# Patient Record
Sex: MALE | Race: WHITE | Employment: UNEMPLOYED | URBAN - METROPOLITAN AREA
[De-identification: names, ages, dates, MRNs, and addresses within clinical notes are randomized per-mention and may not be internally consistent; named-entity substitution may affect disease eponyms.]

---

## 2024-10-03 ENCOUNTER — OFFICE VISIT (OUTPATIENT)
Age: 69
End: 2024-10-03
Payer: MEDICARE

## 2024-10-03 VITALS
DIASTOLIC BLOOD PRESSURE: 64 MMHG | WEIGHT: 195.4 LBS | BODY MASS INDEX: 27.35 KG/M2 | OXYGEN SATURATION: 99 % | SYSTOLIC BLOOD PRESSURE: 120 MMHG | HEIGHT: 71 IN | HEART RATE: 57 BPM

## 2024-10-03 DIAGNOSIS — R06.02 SHORTNESS OF BREATH: ICD-10-CM

## 2024-10-03 DIAGNOSIS — R07.9 CHEST PAIN, UNSPECIFIED TYPE: Primary | ICD-10-CM

## 2024-10-03 DIAGNOSIS — E78.2 MIXED HYPERLIPIDEMIA: ICD-10-CM

## 2024-10-03 DIAGNOSIS — R00.1 BRADYCARDIA: ICD-10-CM

## 2024-10-03 PROCEDURE — 93005 ELECTROCARDIOGRAM TRACING: CPT | Performed by: INTERNAL MEDICINE

## 2024-10-03 PROCEDURE — 1036F TOBACCO NON-USER: CPT | Performed by: INTERNAL MEDICINE

## 2024-10-03 PROCEDURE — G8427 DOCREV CUR MEDS BY ELIG CLIN: HCPCS | Performed by: INTERNAL MEDICINE

## 2024-10-03 PROCEDURE — G8419 CALC BMI OUT NRM PARAM NOF/U: HCPCS | Performed by: INTERNAL MEDICINE

## 2024-10-03 PROCEDURE — 3017F COLORECTAL CA SCREEN DOC REV: CPT | Performed by: INTERNAL MEDICINE

## 2024-10-03 PROCEDURE — 1123F ACP DISCUSS/DSCN MKR DOCD: CPT | Performed by: INTERNAL MEDICINE

## 2024-10-03 PROCEDURE — G8484 FLU IMMUNIZE NO ADMIN: HCPCS | Performed by: INTERNAL MEDICINE

## 2024-10-03 PROCEDURE — 93010 ELECTROCARDIOGRAM REPORT: CPT | Performed by: INTERNAL MEDICINE

## 2024-10-03 PROCEDURE — 99204 OFFICE O/P NEW MOD 45 MIN: CPT | Performed by: INTERNAL MEDICINE

## 2024-10-03 RX ORDER — PROPRANOLOL HCL 60 MG
1 CAPSULE, EXTENDED RELEASE 24HR ORAL
COMMUNITY
Start: 2024-01-08

## 2024-10-03 RX ORDER — PRIMIDONE 250 MG/1
250 TABLET ORAL
COMMUNITY
Start: 2024-04-11

## 2024-10-03 RX ORDER — ASPIRIN 81 MG/1
81 TABLET ORAL
COMMUNITY
Start: 2024-09-25

## 2024-10-03 RX ORDER — ATORVASTATIN CALCIUM 40 MG/1
40 TABLET, FILM COATED ORAL
COMMUNITY
Start: 2024-09-25

## 2024-10-03 RX ORDER — NITROGLYCERIN 0.4 MG/1
0.4 TABLET SUBLINGUAL
COMMUNITY
Start: 2024-09-25

## 2024-10-03 RX ORDER — LORATADINE 10 MG/1
10 TABLET ORAL DAILY
COMMUNITY

## 2024-10-03 RX ORDER — ISOSORBIDE MONONITRATE 30 MG/1
30 TABLET, EXTENDED RELEASE ORAL DAILY
Qty: 30 TABLET | Refills: 3 | Status: SHIPPED | OUTPATIENT
Start: 2024-10-03

## 2024-10-03 RX ORDER — HYALURONATE SODIUM, STABILIZED 88 MG/4 ML
SYRINGE (ML) INTRAARTICULAR
COMMUNITY
Start: 2024-09-16

## 2024-10-03 RX ORDER — ACETAMINOPHEN 160 MG
1 TABLET,DISINTEGRATING ORAL
COMMUNITY

## 2024-10-03 ASSESSMENT — PATIENT HEALTH QUESTIONNAIRE - PHQ9
SUM OF ALL RESPONSES TO PHQ QUESTIONS 1-9: 0
1. LITTLE INTEREST OR PLEASURE IN DOING THINGS: NOT AT ALL
SUM OF ALL RESPONSES TO PHQ QUESTIONS 1-9: 0
SUM OF ALL RESPONSES TO PHQ9 QUESTIONS 1 & 2: 0
2. FEELING DOWN, DEPRESSED OR HOPELESS: NOT AT ALL

## 2024-10-03 NOTE — PROGRESS NOTES
Cardiology  Clinic -   New Patient  Visit   Date of Service : 10/3/2024    Name: Marlo Chung  Patient ID: [id number]  Age: 69 y.o. Sex: male YOB: 1955    Author: COREY BANERJEE MD    PCP: Jason Villar MD    Referring Provider:Jason Villar MD    Reason for Visit / CC:    Chief Complaint   Patient presents with    New Patient         ASSESSMENT  AND PLAN           Assessment & Plan        1. Recurrent episodes of chest pain with abnormal nuclear stress test suggestive of inferior wall ischemia.  He continues to complain of intermittent episodes of chest pain radiating to the left. He was recently hospitalized in [place] with worsening chest pain and was advised to undergo left heart catheterization but preferred to get it done in his hometown. He continues to have chest pain despite being on propranolol and Imdur. A left heart catheterization will be scheduled to assess coronary anatomy. Continue aspirin 81 mg p.o. q. daily along with Lipitor 40 mg once daily.  Risk-benefit alternative of left heart catheterization discussed with the patient patient agrees to proceed  Will also get an echo to assess left ventricular systolic and diastolic function    2. Hyperlipidemia.  Fairly well controlled on Lipitor 40 mg once daily.    3. Left ankle and foot swelling most likely due to recent travel, now recovered.  If it returns, ruling out DVT may be considered.    4. Hand tremors.  Continue propranolol and primidone for tremors.    3. Former smoker.  He quit smoking over 20 years ago and was congratulated on this achievement.     4.  Severe sinus bradycardia asymptomatic --plan to-Holter monitor to rule out any significant cardiac arrhythmias    Follow-up  Return in 3 months for follow up.          Patient advised aggressive risk factor and lifestyle modification- diet and exercise counseling done   patient advised strict compliance with medication and follow-up   handouts were given to

## 2024-10-04 ENCOUNTER — TELEPHONE (OUTPATIENT)
Age: 69
End: 2024-10-04

## 2024-10-04 DIAGNOSIS — R94.39 ABNORMAL STRESS TEST: Primary | ICD-10-CM

## 2024-10-04 NOTE — TELEPHONE ENCOUNTER
Spoke to patient and scheduled him for C with Dr. Larsen on 10/15 @ 10:45 am with 8:45 am arrival. Instructions gone over with patient and advised labs already done, no medications to hold. Patient verbalized understanding and had no questions at the time of call.    Patient identification verified x2.         Patient Instructions    Catheterization     Pre-procedure instructions  Lab work: Already done.  [facility] Draw Sites:  [facility] [place]: [address] [place] [address] [address] [facility] [address]  [place]: [address] [facility] [address]  [place]: [address] [place] [place] [address] [facility] [address]  [facility] [place]: [address] [facility] [address]  [place]: [address] [facility] [address]  [facility]: [address]  [address] [facility] [address]  [place]: [address] [facility] [address]  Critical access hospital: [address]. [place] [address]  The night before the procedure nothing to eat or drink after midnight, you may take approved medications the morning of the procedure with a few sips of water.  Stop blood thinners 2 days prior to procedure EXCEPT: Brilinta, Plavix or Aspirin  Medications restrictions: No medications to hold.    Procedure day  Have a  that will bring you and take you home (6-8 hours)  Have a responsible adult that can stay with you for 24 hours  Bring ID and insurance card  Wear comfortable clothing  Leave valuables at home,   Bring: dentures, hearing aids, or glasses  Bring overnight bag (just in case of an overnight stay)  Where to report  [facility]: go through main entrance and to the left is outpatient registration    Date of procedure: 10/15 w/ Dr. Larsen  Arrival Time: 8:45 am    Post procedure instructions  No driving for 24 hours  No heavy lifting (over 10lbs) or strenuous activity for 48 hours  No baths, swimming, hot tubs, or spas for a week  The band aid over the

## 2024-10-11 ENCOUNTER — PREP FOR PROCEDURE (OUTPATIENT)
Age: 69
End: 2024-10-11

## 2024-10-15 ENCOUNTER — HOSPITAL ENCOUNTER (OUTPATIENT)
Facility: HOSPITAL | Age: 69
Discharge: HOME | End: 2024-10-15
Attending: INTERNAL MEDICINE | Admitting: INTERNAL MEDICINE
Payer: MEDICARE

## 2024-10-15 VITALS
SYSTOLIC BLOOD PRESSURE: 119 MMHG | HEIGHT: 71 IN | TEMPERATURE: 98 F | DIASTOLIC BLOOD PRESSURE: 72 MMHG | BODY MASS INDEX: 26.6 KG/M2 | HEART RATE: 55 BPM | OXYGEN SATURATION: 95 % | WEIGHT: 190 LBS | RESPIRATION RATE: 17 BRPM

## 2024-10-15 DIAGNOSIS — R94.39 ABNORMAL STRESS TEST: ICD-10-CM

## 2024-10-15 DIAGNOSIS — I20.81 ANGINA PECTORIS WITH CORONARY MICROVASCULAR DYSFUNCTION (HCC): Primary | ICD-10-CM

## 2024-10-15 LAB — ECHO BSA: 2.08 M2

## 2024-10-15 PROCEDURE — 6360000004 HC RX CONTRAST MEDICATION: Performed by: INTERNAL MEDICINE

## 2024-10-15 PROCEDURE — 6360000002 HC RX W HCPCS: Performed by: INTERNAL MEDICINE

## 2024-10-15 PROCEDURE — C1713 ANCHOR/SCREW BN/BN,TIS/BN: HCPCS | Performed by: INTERNAL MEDICINE

## 2024-10-15 PROCEDURE — 2500000003 HC RX 250 WO HCPCS: Performed by: INTERNAL MEDICINE

## 2024-10-15 PROCEDURE — C1894 INTRO/SHEATH, NON-LASER: HCPCS | Performed by: INTERNAL MEDICINE

## 2024-10-15 PROCEDURE — 2709999900 HC NON-CHARGEABLE SUPPLY: Performed by: INTERNAL MEDICINE

## 2024-10-15 PROCEDURE — 2580000003 HC RX 258: Performed by: INTERNAL MEDICINE

## 2024-10-15 PROCEDURE — 99152 MOD SED SAME PHYS/QHP 5/>YRS: CPT | Performed by: INTERNAL MEDICINE

## 2024-10-15 PROCEDURE — 93458 L HRT ARTERY/VENTRICLE ANGIO: CPT | Performed by: INTERNAL MEDICINE

## 2024-10-15 RX ORDER — ACETAMINOPHEN 325 MG/1
650 TABLET ORAL EVERY 4 HOURS PRN
Status: DISCONTINUED | OUTPATIENT
Start: 2024-10-15 | End: 2024-10-15 | Stop reason: HOSPADM

## 2024-10-15 RX ORDER — HEPARIN SODIUM 200 [USP'U]/100ML
INJECTION, SOLUTION INTRAVENOUS CONTINUOUS PRN
Status: COMPLETED | OUTPATIENT
Start: 2024-10-15 | End: 2024-10-15

## 2024-10-15 RX ORDER — MIDAZOLAM HYDROCHLORIDE 1 MG/ML
INJECTION INTRAMUSCULAR; INTRAVENOUS PRN
Status: DISCONTINUED | OUTPATIENT
Start: 2024-10-15 | End: 2024-10-15 | Stop reason: HOSPADM

## 2024-10-15 RX ORDER — SODIUM CHLORIDE 9 MG/ML
INJECTION, SOLUTION INTRAVENOUS CONTINUOUS
Status: DISCONTINUED | OUTPATIENT
Start: 2024-10-15 | End: 2024-10-15 | Stop reason: HOSPADM

## 2024-10-15 RX ORDER — LIDOCAINE HYDROCHLORIDE 10 MG/ML
INJECTION, SOLUTION INFILTRATION; PERINEURAL PRN
Status: DISCONTINUED | OUTPATIENT
Start: 2024-10-15 | End: 2024-10-15 | Stop reason: HOSPADM

## 2024-10-15 RX ORDER — SODIUM CHLORIDE 0.9 % (FLUSH) 0.9 %
5-40 SYRINGE (ML) INJECTION EVERY 12 HOURS SCHEDULED
Status: DISCONTINUED | OUTPATIENT
Start: 2024-10-15 | End: 2024-10-15 | Stop reason: HOSPADM

## 2024-10-15 RX ORDER — SODIUM CHLORIDE 9 MG/ML
INJECTION, SOLUTION INTRAVENOUS PRN
Status: DISCONTINUED | OUTPATIENT
Start: 2024-10-15 | End: 2024-10-15 | Stop reason: HOSPADM

## 2024-10-15 RX ORDER — HEPARIN SODIUM 1000 [USP'U]/ML
INJECTION, SOLUTION INTRAVENOUS; SUBCUTANEOUS PRN
Status: DISCONTINUED | OUTPATIENT
Start: 2024-10-15 | End: 2024-10-15 | Stop reason: HOSPADM

## 2024-10-15 RX ORDER — SODIUM CHLORIDE 9 MG/ML
INJECTION, SOLUTION INTRAVENOUS ONCE
Status: COMPLETED | OUTPATIENT
Start: 2024-10-15 | End: 2024-10-15

## 2024-10-15 RX ORDER — SODIUM CHLORIDE 0.9 % (FLUSH) 0.9 %
5-40 SYRINGE (ML) INJECTION PRN
Status: DISCONTINUED | OUTPATIENT
Start: 2024-10-15 | End: 2024-10-15 | Stop reason: HOSPADM

## 2024-10-15 RX ORDER — FENTANYL CITRATE 50 UG/ML
INJECTION, SOLUTION INTRAMUSCULAR; INTRAVENOUS PRN
Status: DISCONTINUED | OUTPATIENT
Start: 2024-10-15 | End: 2024-10-15 | Stop reason: HOSPADM

## 2024-10-15 RX ORDER — IOPAMIDOL 755 MG/ML
INJECTION, SOLUTION INTRAVASCULAR PRN
Status: DISCONTINUED | OUTPATIENT
Start: 2024-10-15 | End: 2024-10-15 | Stop reason: HOSPADM

## 2024-10-15 RX ADMIN — SODIUM CHLORIDE: 9 INJECTION, SOLUTION INTRAVENOUS at 10:25

## 2024-10-15 NOTE — PROGRESS NOTES
Cardiac Cath Lab Procedure Area Arrival Note:    Marlo Chung arrived to Cardiac Cath Lab, Procedure Area. Patient identifiers verified with NAME and DATE OF BIRTH. Procedure verified with patient. Consent forms verified. Allergies verified. Patient informed of procedure and plan of care. Questions answered with review. Patient voiced understanding of procedure and plan of care.    Patient on cardiac monitor, non-invasive blood pressure, SPO2 monitor.  IV of NS on pump at 50 ml/hr. Patient status doing well without problems. Patient is A&Ox 4.      Patient medicated during procedure with orders obtained and verified by Dr. Larsen.    Refer to patients Cardiac Cath Lab PROCEDURE REPORT for vital signs, assessment, status, and response during procedure, printed at end of case. Printed report on chart or scanned into chart.

## 2024-10-15 NOTE — PROGRESS NOTES
Pt's family updated on Pt's post procedure and discharge status.    Pt sitting up tolerating food tray well; no complaints.

## 2024-10-15 NOTE — RESULT ENCOUNTER NOTE
Left heart catheterization showed nonobstructive coronary artery disease moderate blockage in 1 vessel 60% in OM1 and mild into other vessels  Will continue aggressive medical therapy including aspirin 81 mg daily along with Lipitor 40 mg once daily  Continue to monitor lipid panel with target LDL of less than 60 mg/dL

## 2024-10-15 NOTE — PROGRESS NOTES
TRANSFER - IN REPORT:    Verbal report received from Americo ALAMO on Marlo Chung  being received from procedure area for routine progression of patient care. Report consisted of patient’s Situation, Background, Assessment and Recommendations(SBAR). Information from the following report(s)SBAR, Procedure Summary, MAR, Recent Results, and Cardiac Rhythm Sinus Joss  was reviewed with the receiving clinician. Opportunity for questions and clarification was provided. Assessment completed upon patient’s arrival to Cardiac Cath Lab RECOVERY AREA and care assumed.       Cardiac Cath Lab Recovery Arrival Note:    Marlo Chung arrived to CCL recovery area.  Patient procedure= LHC. Patient on cardiac monitor, non-invasive blood pressure, SPO2 monitor. On Room Air .  IV  of NS on pump at 75 ml/hr. Patient status doing well without problems. Patient is A&Ox 4. Patient reports No Paain.    PROCEDURE SITE CHECK:    Procedure site:without any bleeding and No Hematoma, No pain/discomfort reported at procedure site.     No change in patient status. Continue to monitor patient and status.

## 2024-10-15 NOTE — DISCHARGE INSTRUCTIONS
Future Appointments   Date Time Provider Department Center   12/24/2024 10:00 AM ALEAH ORANTES ECHO 1 [facility]   1/30/2025 10:00 AM Avila Kinney MD [facility]

## 2024-10-15 NOTE — PROGRESS NOTES
Ambulated patient to the bathroom with a steady gait, voided without difficulty. Patient denies chest pain, shortness of breath, or dizziness. Returned to stretcher. Vital signs stable.     Procedural site is clean, dry, and intact. No bleeding, no hematoma.     Assisted patient in dressing/Patient dressed self.     Educated patient about their sedation precautions such as not driving, operating any machinery, or signing legal documents 24 hours post procedure.     Reviewed discharge instructions, including medications and site care using the teach back method.  Answered all questions. Verbalized understanding.     Removed peripheral IV.    Escorted to discharge area in a wheelchair with all of their belongings including Cell phone and discharge instructions.    Patient's relative/step son Juan Alberto present to take patient home. Reviewed discharge instructions with patients' relative, they verbalized understanding.

## 2024-10-15 NOTE — PROGRESS NOTES
CATH LAB to RECOVERY ROOM REPORT    Procedure: [facility]    MD: FELICITAS Larsen MD    The procedure was diagnostic only.    Verbal Report given to Recovery Nurse on patient being transferred to Cardiac Cath Lab  for routine post-op. Patient stable upon transfer to .  Vitals, mental status, MAR, procedural summary discussed with recovery RN.    Medication given during procedure:    Contrast: 28 mL                          Heparin: 4500 units     Versed: 1 mg                                                               Fentanyl: 25 mcg                                                           Sheaths:    Right radial sheath pulled at 1104 am, band at 12 mL of air

## 2024-10-15 NOTE — PROGRESS NOTES
Cardiac Cath Lab Recovery Arrival Note:      Marlo Chung arrived to Cardiac Cath Lab, Recovery Area. Staff introduced to patient. Patient identifiers verified with NAME and DATE OF BIRTH. Procedure verified with patient. Consent forms reviewed and signed by patient or authorized representative and verified. Allergies verified.     Patient and family oriented to department. Patient and family informed of procedure and plan of care.     Questions answered with review. Patient prepped for procedure, per orders from physician, prior to arrival.    Patient on cardiac monitor, non-invasive blood pressure, SPO2 monitor. On room air. Patient is A&Ox 4. Patient reports no complaints.     Patient in stretcher, in low position, with side rails up, call bell within reach, patient instructed to call if assistance as needed.    Patient prep in: CCL , [place] 4.   Patient family has phone # Hammad son, Juan Alberto [phone]  Family in: at home, needs 15 minutes to come back.

## 2024-11-07 ENCOUNTER — HOSPITAL ENCOUNTER (OUTPATIENT)
Facility: HOSPITAL | Age: 69
Setting detail: RECURRING SERIES
Discharge: HOME | End: 2024-11-10
Attending: INTERNAL MEDICINE
Payer: MEDICARE

## 2024-11-07 VITALS — WEIGHT: 202.6 LBS | BODY MASS INDEX: 28.36 KG/M2 | HEIGHT: 71 IN

## 2024-11-07 PROCEDURE — 93797 PHYS/QHP OP CAR RHAB WO ECG: CPT

## 2024-11-07 PROCEDURE — 93798 PHYS/QHP OP CAR RHAB W/ECG: CPT

## 2024-11-07 ASSESSMENT — PATIENT HEALTH QUESTIONNAIRE - PHQ9
SUM OF ALL RESPONSES TO PHQ QUESTIONS 1-9: 1
10. IF YOU CHECKED OFF ANY PROBLEMS, HOW DIFFICULT HAVE THESE PROBLEMS MADE IT FOR YOU TO DO YOUR WORK, TAKE CARE OF THINGS AT HOME, OR GET ALONG WITH OTHER PEOPLE: NOT DIFFICULT AT ALL
7. TROUBLE CONCENTRATING ON THINGS, SUCH AS READING THE NEWSPAPER OR WATCHING TELEVISION: NOT AT ALL
8. MOVING OR SPEAKING SO SLOWLY THAT OTHER PEOPLE COULD HAVE NOTICED. OR THE OPPOSITE, BEING SO FIGETY OR RESTLESS THAT YOU HAVE BEEN MOVING AROUND A LOT MORE THAN USUAL: NOT AT ALL
SUM OF ALL RESPONSES TO PHQ QUESTIONS 1-9: 1
2. FEELING DOWN, DEPRESSED OR HOPELESS: NOT AT ALL
1. LITTLE INTEREST OR PLEASURE IN DOING THINGS: NOT AT ALL
SUM OF ALL RESPONSES TO PHQ9 QUESTIONS 1 & 2: 0
3. TROUBLE FALLING OR STAYING ASLEEP: NOT AT ALL
SUM OF ALL RESPONSES TO PHQ QUESTIONS 1-9: 1
4. FEELING TIRED OR HAVING LITTLE ENERGY: SEVERAL DAYS
5. POOR APPETITE OR OVEREATING: NOT AT ALL
9. THOUGHTS THAT YOU WOULD BE BETTER OFF DEAD, OR OF HURTING YOURSELF: NOT AT ALL
6. FEELING BAD ABOUT YOURSELF - OR THAT YOU ARE A FAILURE OR HAVE LET YOURSELF OR YOUR FAMILY DOWN: NOT AT ALL
SUM OF ALL RESPONSES TO PHQ QUESTIONS 1-9: 1

## 2024-11-07 ASSESSMENT — EXERCISE STRESS TEST
PEAK_METS: 2.4
PEAK_HR: 83
PEAK_RPE: 10
PEAK_BP: 172/70

## 2024-11-07 NOTE — CARDIO/PULMONARY
INTAKE APPOINTMENT NOTE  2024    NAME: Marlo Chung : 1955 AGE: 69 y.o.  GENDER: male    CARDIAC REHAB ADMITTING DIAGNOSIS: Angina pectoris with coronary microvascular dysfunction (HCC) [I20.81]    REFERRING PHYSICIAN: Avila Kinney MD    MEDICAL HX:  Past Medical History:   Diagnosis Date    Angina concurrent with and due to arteriosclerosis of coronary artery (HCC) 10/15/2024    Microvascular dysfunction per Dr Avila Kinney    Arthritis     Rt TKR, Lt knee pain, Lt shoulder rotator cuff tear    CAD (coronary artery disease) 10/15/2024    cardiac cath with OM1 60%    Hyperlipidemia     Hypertension     Tremor     Benign Essential Tremors       LABS:     2024   Hgb A1c    5.5  2024   ,  ,  HDL 52,        ANTHROPOMETRICS:      Ht Readings from Last 1 Encounters:   24 1.803 m (5' 11\")      Wt Readings from Last 1 Encounters:   24 91.9 kg (202 lb 9.6 oz)        WAIST:         VISIT SUMMARY:    Marlo Chung 69 y.o. presented to Cardiac Rehab for program orientation and 6 minute walk test today with a primary diagnosis of Angina pectoris with coronary microvascular dysfunction (HCC) [I20.81]. Cardiac cath revealed OM1 60% (24). EF is 64%. Cardiac risk factors include: HTN, HLD, age, and gender. Pt is a remote smoker (quit mid ).    Marlo Chung resides in [place] with his wife and a lovable small dog. Pt has 2 sons, one daughter, and one step-son. Step-son resides locally & is very supportive. Other children live in [place], [place], [place], and [place]. 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 Patient was evaluated for depression using the PHQ-9 assessment tool with a result of which is considered a very low score.The result was discussed with patient, who reported he rarely worries about anything. He expressed some concern about his wife's \"poor health.\"    Patient denied chest pain or SOB during 6 minute walk test and was

## 2024-11-13 ENCOUNTER — HOSPITAL ENCOUNTER (OUTPATIENT)
Facility: HOSPITAL | Age: 69
Setting detail: RECURRING SERIES
Discharge: HOME | End: 2024-11-16
Attending: INTERNAL MEDICINE
Payer: MEDICARE

## 2024-11-13 VITALS — WEIGHT: 199.2 LBS | BODY MASS INDEX: 27.78 KG/M2

## 2024-11-13 PROCEDURE — 93798 PHYS/QHP OP CAR RHAB W/ECG: CPT

## 2024-11-13 ASSESSMENT — EXERCISE STRESS TEST
PEAK_HR: 87
PEAK_RPE: 11
PEAK_METS: 2.4

## 2024-11-15 ENCOUNTER — HOSPITAL ENCOUNTER (OUTPATIENT)
Facility: HOSPITAL | Age: 69
Setting detail: RECURRING SERIES
Discharge: HOME | End: 2024-11-18
Attending: INTERNAL MEDICINE
Payer: MEDICARE

## 2024-11-15 VITALS — BODY MASS INDEX: 28.03 KG/M2 | WEIGHT: 201 LBS

## 2024-11-15 PROCEDURE — 93798 PHYS/QHP OP CAR RHAB W/ECG: CPT

## 2024-11-15 ASSESSMENT — EXERCISE STRESS TEST
PEAK_METS: 2.5
PEAK_RPE: 11
PEAK_HR: 87

## 2024-11-18 ENCOUNTER — HOSPITAL ENCOUNTER (OUTPATIENT)
Facility: HOSPITAL | Age: 69
Setting detail: RECURRING SERIES
Discharge: HOME | End: 2024-11-21
Attending: INTERNAL MEDICINE
Payer: MEDICARE

## 2024-11-18 VITALS — BODY MASS INDEX: 27.62 KG/M2 | WEIGHT: 198 LBS

## 2024-11-18 PROCEDURE — 93798 PHYS/QHP OP CAR RHAB W/ECG: CPT

## 2024-11-18 ASSESSMENT — EXERCISE STRESS TEST
PEAK_RPE: 11
PEAK_METS: 3
PEAK_HR: 103

## 2024-11-19 ENCOUNTER — HOSPITAL ENCOUNTER (OUTPATIENT)
Facility: HOSPITAL | Age: 69
Setting detail: RECURRING SERIES
Discharge: HOME | End: 2024-11-22
Attending: INTERNAL MEDICINE
Payer: MEDICARE

## 2024-11-19 VITALS — BODY MASS INDEX: 27.81 KG/M2 | WEIGHT: 199.4 LBS

## 2024-11-19 PROCEDURE — 93798 PHYS/QHP OP CAR RHAB W/ECG: CPT

## 2024-11-19 ASSESSMENT — EXERCISE STRESS TEST
PEAK_HR: 112
PEAK_RPE: 11
PEAK_METS: 3

## 2024-11-22 ENCOUNTER — HOSPITAL ENCOUNTER (OUTPATIENT)
Facility: HOSPITAL | Age: 69
Setting detail: RECURRING SERIES
Discharge: HOME | End: 2024-11-25
Attending: INTERNAL MEDICINE
Payer: MEDICARE

## 2024-11-22 VITALS — BODY MASS INDEX: 27.84 KG/M2 | WEIGHT: 199.6 LBS

## 2024-11-22 PROCEDURE — 93798 PHYS/QHP OP CAR RHAB W/ECG: CPT

## 2024-11-22 ASSESSMENT — EXERCISE STRESS TEST
PEAK_METS: 3
PEAK_HR: 111
PEAK_RPE: 11

## 2024-11-25 ENCOUNTER — HOSPITAL ENCOUNTER (OUTPATIENT)
Facility: HOSPITAL | Age: 69
Setting detail: RECURRING SERIES
Discharge: HOME | End: 2024-11-28
Attending: INTERNAL MEDICINE
Payer: MEDICARE

## 2024-11-25 VITALS — WEIGHT: 198.2 LBS | BODY MASS INDEX: 27.64 KG/M2

## 2024-11-25 PROCEDURE — 93798 PHYS/QHP OP CAR RHAB W/ECG: CPT

## 2024-11-25 ASSESSMENT — EXERCISE STRESS TEST
PEAK_RPE: 11-12
PEAK_HR: 120
PEAK_METS: 3

## 2024-11-26 ENCOUNTER — HOSPITAL ENCOUNTER (OUTPATIENT)
Facility: HOSPITAL | Age: 69
Setting detail: RECURRING SERIES
Discharge: HOME | End: 2024-11-29
Attending: INTERNAL MEDICINE
Payer: MEDICARE

## 2024-11-26 VITALS — WEIGHT: 199.6 LBS | BODY MASS INDEX: 27.84 KG/M2

## 2024-11-26 PROCEDURE — 93798 PHYS/QHP OP CAR RHAB W/ECG: CPT

## 2024-11-26 ASSESSMENT — EXERCISE STRESS TEST
PEAK_METS: 3.2
PEAK_RPE: 12
PEAK_HR: 118

## 2024-12-02 ENCOUNTER — HOSPITAL ENCOUNTER (OUTPATIENT)
Facility: HOSPITAL | Age: 69
Setting detail: RECURRING SERIES
Discharge: HOME | End: 2024-12-05
Payer: MEDICARE

## 2024-12-02 VITALS — BODY MASS INDEX: 27.95 KG/M2 | WEIGHT: 200.4 LBS

## 2024-12-02 PROCEDURE — 93798 PHYS/QHP OP CAR RHAB W/ECG: CPT

## 2024-12-02 ASSESSMENT — EXERCISE STRESS TEST
PEAK_RPE: 12
PEAK_METS: 3.2
PEAK_HR: 99

## 2024-12-03 NOTE — PROGRESS NOTES
Cardiac Rehab Nutrition Assessment- 1:1 Evaluation  12/3/2024      NAME: Marlo Chung : 1955 AGE: 69 y.o.  GENDER: male  CARDIAC REHAB ADMITTING DIAGNOSIS: Angina pectoris with coronary microvascular dysfunction (HCC) [I20.81]    PROBLEM LIST:  Patient Active Problem List   Diagnosis    Hyperlipidemia    Abnormal stress test   Cardiac cath revealed OM1 60% (24). EF is 64%. Cardiac risk factors include: HTN, HLD, age, and gender. Pt is a remote smoker (quit mid ).       LABS:   No results found for: \"LABA1C\"  No results found for: \"NA\", \"K\", \"CL\", \"CO2\", \"BUN\", \"CREATININE\", \"GLUCOSE\", \"CALCIUM\", \"LABALBU\", \"BILITOT\", \"ALKPHOS\", \"AST\", \"ALT\", \"LABGLOM\", \"GFRAA\", \"AGRATIO\", \"GLOB\"      No results found for: \"CHOL\", \"TRIG\", \"HDL\", \"LDL\", \"VLDL\", \"CHOLHDLRATIO\"      MEDICATIONS/SUPPLEMENTS:   Scheduled Meds:  Continuous Infusions:  PRN Meds:.  Prior to Admission medications    Medication Sig Start Date End Date Taking? Authorizing Provider   aspirin 81 MG EC tablet Take 1 tablet by mouth 24   Sondra Manning MD   atorvastatin (LIPITOR) 40 MG tablet Take 1 tablet by mouth 24   Sondra Manning MD   vitamin D (VITAMIN D3) 50 MCG (2000) CAPS capsule 1 capsule    Sondra Manning MD   cyanocobalamin 50 MCG tablet Take by mouth    Sondra Manning MD   MONOVISC 88 MG/4ML injection Inject contents of pre-filled syringe intra-articularly into the left knee one time in the doctor's office. 24   Sondra Manning MD   NITROSTAT 0.4 MG SL tablet Place 1 tablet under the tongue 24   Sondra Manning MD   loratadine (CLARITIN) 10 MG tablet Take 1 tablet by mouth daily    Sondra Manning MD   primidone (MYSOLINE) 250 MG tablet Take 1 tablet by mouth 24   Provider, MD Sondra   propranolol (INDERAL LA) 60 MG extended release capsule 1 capsule 24   Provider, MD Sondra   isosorbide mononitrate (IMDUR) 30 MG extended release

## 2024-12-04 ENCOUNTER — HOSPITAL ENCOUNTER (OUTPATIENT)
Facility: HOSPITAL | Age: 69
Setting detail: RECURRING SERIES
Discharge: HOME | End: 2024-12-07
Payer: MEDICARE

## 2024-12-04 ENCOUNTER — HOSPITAL ENCOUNTER (OUTPATIENT)
Facility: HOSPITAL | Age: 69
Setting detail: RECURRING SERIES
Discharge: HOME | End: 2024-12-07
Attending: INTERNAL MEDICINE
Payer: MEDICARE

## 2024-12-04 VITALS — BODY MASS INDEX: 27.92 KG/M2 | WEIGHT: 200.2 LBS

## 2024-12-04 PROCEDURE — 93797 PHYS/QHP OP CAR RHAB WO ECG: CPT

## 2024-12-04 PROCEDURE — 93798 PHYS/QHP OP CAR RHAB W/ECG: CPT

## 2024-12-04 ASSESSMENT — EXERCISE STRESS TEST
PEAK_HR: 99
PEAK_METS: 3.2
PEAK_RPE: 12

## 2024-12-11 ENCOUNTER — HOSPITAL ENCOUNTER (OUTPATIENT)
Facility: HOSPITAL | Age: 69
Setting detail: RECURRING SERIES
Discharge: HOME | End: 2024-12-14
Attending: INTERNAL MEDICINE
Payer: MEDICARE

## 2024-12-11 VITALS — WEIGHT: 198.8 LBS | BODY MASS INDEX: 27.73 KG/M2

## 2024-12-11 PROCEDURE — 93798 PHYS/QHP OP CAR RHAB W/ECG: CPT

## 2024-12-11 ASSESSMENT — EXERCISE STRESS TEST
PEAK_HR: 103
PEAK_METS: 3.2
PEAK_RPE: 12

## 2024-12-13 ENCOUNTER — HOSPITAL ENCOUNTER (OUTPATIENT)
Facility: HOSPITAL | Age: 69
Setting detail: RECURRING SERIES
Discharge: HOME | End: 2024-12-16
Attending: INTERNAL MEDICINE
Payer: MEDICARE

## 2024-12-13 VITALS — BODY MASS INDEX: 27.67 KG/M2 | WEIGHT: 198.4 LBS

## 2024-12-13 PROCEDURE — 93798 PHYS/QHP OP CAR RHAB W/ECG: CPT

## 2024-12-13 ASSESSMENT — EXERCISE STRESS TEST
PEAK_METS: 3
PEAK_RPE: 12
PEAK_HR: 107

## 2024-12-16 ENCOUNTER — HOSPITAL ENCOUNTER (OUTPATIENT)
Facility: HOSPITAL | Age: 69
Setting detail: RECURRING SERIES
Discharge: HOME | End: 2024-12-19
Attending: INTERNAL MEDICINE
Payer: MEDICARE

## 2024-12-16 VITALS — WEIGHT: 199 LBS | BODY MASS INDEX: 27.75 KG/M2

## 2024-12-16 PROCEDURE — 93798 PHYS/QHP OP CAR RHAB W/ECG: CPT

## 2024-12-16 ASSESSMENT — EXERCISE STRESS TEST
PEAK_HR: 106
PEAK_METS: 3.2
PEAK_RPE: 12-13

## 2024-12-18 ENCOUNTER — HOSPITAL ENCOUNTER (OUTPATIENT)
Facility: HOSPITAL | Age: 69
Setting detail: RECURRING SERIES
Discharge: HOME | End: 2024-12-21
Attending: INTERNAL MEDICINE
Payer: MEDICARE

## 2024-12-18 VITALS — WEIGHT: 187.2 LBS | BODY MASS INDEX: 26.11 KG/M2

## 2024-12-18 PROCEDURE — 93798 PHYS/QHP OP CAR RHAB W/ECG: CPT

## 2024-12-18 ASSESSMENT — EXERCISE STRESS TEST
PEAK_HR: 105
PEAK_RPE: 12-13
PEAK_METS: 3.2

## 2024-12-23 ENCOUNTER — APPOINTMENT (OUTPATIENT)
Facility: HOSPITAL | Age: 69
End: 2024-12-23
Attending: INTERNAL MEDICINE
Payer: MEDICARE

## 2024-12-24 ENCOUNTER — ANCILLARY PROCEDURE (OUTPATIENT)
Age: 69
End: 2024-12-24
Payer: MEDICARE

## 2024-12-24 VITALS
WEIGHT: 187 LBS | HEIGHT: 71 IN | BODY MASS INDEX: 26.18 KG/M2 | DIASTOLIC BLOOD PRESSURE: 70 MMHG | HEART RATE: 55 BPM | SYSTOLIC BLOOD PRESSURE: 128 MMHG

## 2024-12-24 DIAGNOSIS — R06.02 SHORTNESS OF BREATH: ICD-10-CM

## 2024-12-24 DIAGNOSIS — R07.9 CHEST PAIN, UNSPECIFIED TYPE: ICD-10-CM

## 2024-12-24 PROCEDURE — 93306 TTE W/DOPPLER COMPLETE: CPT | Performed by: INTERNAL MEDICINE

## 2024-12-25 LAB
ECHO AO ASC DIAM: 3.1 CM
ECHO AO ASCENDING AORTA INDEX: 1.51 CM/M2
ECHO AO ROOT DIAM: 3.6 CM
ECHO AO ROOT INDEX: 1.76 CM/M2
ECHO AV AREA PEAK VELOCITY: 2.9 CM2
ECHO AV AREA VTI: 3.1 CM2
ECHO AV AREA/BSA PEAK VELOCITY: 1.4 CM2/M2
ECHO AV AREA/BSA VTI: 1.5 CM2/M2
ECHO AV MEAN GRADIENT: 3 MMHG
ECHO AV MEAN VELOCITY: 0.8 M/S
ECHO AV PEAK GRADIENT: 6 MMHG
ECHO AV PEAK VELOCITY: 1.2 M/S
ECHO AV VELOCITY RATIO: 0.75
ECHO AV VTI: 24 CM
ECHO BSA: 2.06 M2
ECHO EST RA PRESSURE: 8 MMHG
ECHO LA DIAMETER INDEX: 2 CM/M2
ECHO LA DIAMETER: 4.1 CM
ECHO LA TO AORTIC ROOT RATIO: 1.14
ECHO LA VOL A-L A2C: 50 ML (ref 18–58)
ECHO LA VOL A-L A4C: 47 ML (ref 18–58)
ECHO LA VOL BP: 48 ML (ref 18–58)
ECHO LA VOL MOD A2C: 47 ML (ref 18–58)
ECHO LA VOL MOD A4C: 44 ML (ref 18–58)
ECHO LA VOL/BSA BIPLANE: 23 ML/M2 (ref 16–34)
ECHO LA VOLUME AREA LENGTH: 51 ML
ECHO LA VOLUME INDEX A-L A2C: 24 ML/M2 (ref 16–34)
ECHO LA VOLUME INDEX A-L A4C: 23 ML/M2 (ref 16–34)
ECHO LA VOLUME INDEX AREA LENGTH: 25 ML/M2 (ref 16–34)
ECHO LA VOLUME INDEX MOD A2C: 23 ML/M2 (ref 16–34)
ECHO LA VOLUME INDEX MOD A4C: 21 ML/M2 (ref 16–34)
ECHO LV E' LATERAL VELOCITY: 9.76 CM/S
ECHO LV E' SEPTAL VELOCITY: 8.24 CM/S
ECHO LV EF PHYSICIAN: 60 %
ECHO LV FRACTIONAL SHORTENING: 37 % (ref 28–44)
ECHO LV INTERNAL DIMENSION DIASTOLE INDEX: 2.54 CM/M2
ECHO LV INTERNAL DIMENSION DIASTOLIC: 5.2 CM (ref 4.2–5.9)
ECHO LV INTERNAL DIMENSION SYSTOLIC INDEX: 1.61 CM/M2
ECHO LV INTERNAL DIMENSION SYSTOLIC: 3.3 CM
ECHO LV IVSD: 1 CM (ref 0.6–1)
ECHO LV MASS 2D: 194.2 G (ref 88–224)
ECHO LV MASS INDEX 2D: 94.7 G/M2 (ref 49–115)
ECHO LV POSTERIOR WALL DIASTOLIC: 1 CM (ref 0.6–1)
ECHO LV RELATIVE WALL THICKNESS RATIO: 0.38
ECHO LVOT AREA: 3.8 CM2
ECHO LVOT AV VTI INDEX: 0.8
ECHO LVOT DIAM: 2.2 CM
ECHO LVOT MEAN GRADIENT: 2 MMHG
ECHO LVOT PEAK GRADIENT: 3 MMHG
ECHO LVOT PEAK VELOCITY: 0.9 M/S
ECHO LVOT STROKE VOLUME INDEX: 35.6 ML/M2
ECHO LVOT SV: 72.9 ML
ECHO LVOT VTI: 19.2 CM
ECHO MV A VELOCITY: 0.77 M/S
ECHO MV E DECELERATION TIME (DT): 190 MS
ECHO MV E VELOCITY: 0.75 M/S
ECHO MV E/A RATIO: 0.97
ECHO MV E/E' LATERAL: 7.68
ECHO MV E/E' RATIO (AVERAGED): 8.39
ECHO MV E/E' SEPTAL: 9.1
ECHO PULMONARY ARTERY END DIASTOLIC PRESSURE: 2 MMHG
ECHO PV MAX VELOCITY: 1.1 M/S
ECHO PV PEAK GRADIENT: 5 MMHG
ECHO PV REGURGITANT MAX VELOCITY: 0.7 M/S
ECHO RIGHT VENTRICULAR SYSTOLIC PRESSURE (RVSP): 31 MMHG
ECHO RV BASAL DIMENSION: 3.4 CM
ECHO RV TAPSE: 1.9 CM (ref 1.7–?)
ECHO RVOT PEAK GRADIENT: 1 MMHG
ECHO RVOT PEAK VELOCITY: 0.6 M/S
ECHO TV REGURGITANT MAX VELOCITY: 2.42 M/S
ECHO TV REGURGITANT PEAK GRADIENT: 23 MMHG

## 2024-12-25 PROCEDURE — 93306 TTE W/DOPPLER COMPLETE: CPT | Performed by: INTERNAL MEDICINE

## 2024-12-27 ENCOUNTER — APPOINTMENT (OUTPATIENT)
Facility: HOSPITAL | Age: 69
End: 2024-12-27
Attending: INTERNAL MEDICINE
Payer: MEDICARE

## 2025-01-02 ENCOUNTER — APPOINTMENT (OUTPATIENT)
Facility: HOSPITAL | Age: 70
End: 2025-01-02
Attending: INTERNAL MEDICINE
Payer: MEDICARE

## 2025-01-08 ENCOUNTER — HOSPITAL ENCOUNTER (OUTPATIENT)
Facility: HOSPITAL | Age: 70
Setting detail: RECURRING SERIES
Discharge: HOME | End: 2025-01-11
Attending: INTERNAL MEDICINE
Payer: MEDICARE

## 2025-01-08 VITALS — WEIGHT: 188.8 LBS | BODY MASS INDEX: 26.33 KG/M2

## 2025-01-08 PROCEDURE — 93798 PHYS/QHP OP CAR RHAB W/ECG: CPT

## 2025-01-08 ASSESSMENT — EXERCISE STRESS TEST
PEAK_RPE: 12
PEAK_METS: 3.2
PEAK_HR: 101

## 2025-01-10 ENCOUNTER — HOSPITAL ENCOUNTER (OUTPATIENT)
Facility: HOSPITAL | Age: 70
Setting detail: RECURRING SERIES
Discharge: HOME | End: 2025-01-13
Attending: INTERNAL MEDICINE
Payer: MEDICARE

## 2025-01-10 VITALS — BODY MASS INDEX: 26.42 KG/M2 | WEIGHT: 189.4 LBS

## 2025-01-10 PROCEDURE — 93798 PHYS/QHP OP CAR RHAB W/ECG: CPT

## 2025-01-10 ASSESSMENT — EXERCISE STRESS TEST
PEAK_METS: 4.5
PEAK_RPE: 12
PEAK_HR: 96

## 2025-01-13 ENCOUNTER — HOSPITAL ENCOUNTER (OUTPATIENT)
Facility: HOSPITAL | Age: 70
Setting detail: RECURRING SERIES
Discharge: HOME | End: 2025-01-16
Attending: INTERNAL MEDICINE
Payer: MEDICARE

## 2025-01-13 VITALS — WEIGHT: 189.4 LBS | BODY MASS INDEX: 26.42 KG/M2

## 2025-01-13 PROCEDURE — 93798 PHYS/QHP OP CAR RHAB W/ECG: CPT

## 2025-01-13 ASSESSMENT — EXERCISE STRESS TEST
PEAK_METS: 3.3
PEAK_RPE: 12
PEAK_HR: 100

## 2025-01-15 ENCOUNTER — HOSPITAL ENCOUNTER (OUTPATIENT)
Facility: HOSPITAL | Age: 70
Setting detail: RECURRING SERIES
Discharge: HOME | End: 2025-01-18
Attending: INTERNAL MEDICINE
Payer: MEDICARE

## 2025-01-15 VITALS — WEIGHT: 189.8 LBS | BODY MASS INDEX: 26.47 KG/M2

## 2025-01-15 PROCEDURE — 93798 PHYS/QHP OP CAR RHAB W/ECG: CPT

## 2025-01-15 ASSESSMENT — EXERCISE STRESS TEST
PEAK_METS: 4.5
PEAK_RPE: 12
PEAK_HR: 100

## 2025-01-21 ENCOUNTER — HOSPITAL ENCOUNTER (OUTPATIENT)
Facility: HOSPITAL | Age: 70
Setting detail: RECURRING SERIES
Discharge: HOME | End: 2025-01-24
Attending: INTERNAL MEDICINE
Payer: MEDICARE

## 2025-01-21 ENCOUNTER — TELEPHONE (OUTPATIENT)
Age: 70
End: 2025-01-21

## 2025-01-21 VITALS — BODY MASS INDEX: 26.86 KG/M2 | WEIGHT: 192.6 LBS

## 2025-01-21 PROCEDURE — 93798 PHYS/QHP OP CAR RHAB W/ECG: CPT

## 2025-01-21 ASSESSMENT — EXERCISE STRESS TEST
PEAK_METS: 4.6
PEAK_HR: 96
PEAK_RPE: 12

## 2025-01-21 NOTE — TELEPHONE ENCOUNTER
Mr. Chung had not read my [facility] message with his Echo results. Attempted to call him to give results over the phone. Went to . Left  for return call or to check  messages and respond there.

## 2025-01-23 ENCOUNTER — HOSPITAL ENCOUNTER (OUTPATIENT)
Facility: HOSPITAL | Age: 70
Setting detail: RECURRING SERIES
Discharge: HOME | End: 2025-01-26
Attending: INTERNAL MEDICINE
Payer: MEDICARE

## 2025-01-23 VITALS — WEIGHT: 192.8 LBS | BODY MASS INDEX: 26.89 KG/M2

## 2025-01-23 PROCEDURE — 93798 PHYS/QHP OP CAR RHAB W/ECG: CPT

## 2025-01-23 ASSESSMENT — EXERCISE STRESS TEST
PEAK_HR: 104
PEAK_RPE: 13
PEAK_METS: 4.6

## 2025-01-28 ENCOUNTER — HOSPITAL ENCOUNTER (OUTPATIENT)
Facility: HOSPITAL | Age: 70
Setting detail: RECURRING SERIES
Discharge: HOME | End: 2025-01-31
Attending: INTERNAL MEDICINE
Payer: MEDICARE

## 2025-01-28 VITALS — BODY MASS INDEX: 27.2 KG/M2 | WEIGHT: 195 LBS

## 2025-01-28 PROCEDURE — 93798 PHYS/QHP OP CAR RHAB W/ECG: CPT

## 2025-01-28 ASSESSMENT — EXERCISE STRESS TEST
PEAK_METS: 4.6
PEAK_HR: 101
PEAK_RPE: 13

## 2025-01-31 ENCOUNTER — HOSPITAL ENCOUNTER (OUTPATIENT)
Facility: HOSPITAL | Age: 70
Setting detail: RECURRING SERIES
End: 2025-01-31
Attending: INTERNAL MEDICINE
Payer: MEDICARE

## 2025-01-31 VITALS — BODY MASS INDEX: 27.2 KG/M2 | WEIGHT: 195 LBS

## 2025-01-31 PROCEDURE — 93798 PHYS/QHP OP CAR RHAB W/ECG: CPT

## 2025-01-31 ASSESSMENT — EXERCISE STRESS TEST
PEAK_METS: 4.6
PEAK_RPE: 13
PEAK_HR: 106

## 2025-02-05 ENCOUNTER — OFFICE VISIT (OUTPATIENT)
Age: 70
End: 2025-02-05
Payer: MEDICARE

## 2025-02-05 VITALS
HEIGHT: 71 IN | WEIGHT: 198 LBS | DIASTOLIC BLOOD PRESSURE: 66 MMHG | HEART RATE: 50 BPM | OXYGEN SATURATION: 96 % | SYSTOLIC BLOOD PRESSURE: 118 MMHG | BODY MASS INDEX: 27.72 KG/M2

## 2025-02-05 DIAGNOSIS — I25.85 CHRONIC CORONARY MICROVASCULAR DYSFUNCTION: Primary | ICD-10-CM

## 2025-02-05 DIAGNOSIS — I25.83 CORONARY ARTERY DISEASE DUE TO LIPID RICH PLAQUE: ICD-10-CM

## 2025-02-05 DIAGNOSIS — E78.2 MIXED HYPERLIPIDEMIA: ICD-10-CM

## 2025-02-05 DIAGNOSIS — I20.89 STABLE ANGINA (HCC): ICD-10-CM

## 2025-02-05 DIAGNOSIS — I25.10 CORONARY ARTERY DISEASE DUE TO LIPID RICH PLAQUE: ICD-10-CM

## 2025-02-05 PROCEDURE — 1159F MED LIST DOCD IN RCRD: CPT | Performed by: INTERNAL MEDICINE

## 2025-02-05 PROCEDURE — G8427 DOCREV CUR MEDS BY ELIG CLIN: HCPCS | Performed by: INTERNAL MEDICINE

## 2025-02-05 PROCEDURE — 1123F ACP DISCUSS/DSCN MKR DOCD: CPT | Performed by: INTERNAL MEDICINE

## 2025-02-05 PROCEDURE — 99214 OFFICE O/P EST MOD 30 MIN: CPT | Performed by: INTERNAL MEDICINE

## 2025-02-05 PROCEDURE — 3017F COLORECTAL CA SCREEN DOC REV: CPT | Performed by: INTERNAL MEDICINE

## 2025-02-05 PROCEDURE — 1036F TOBACCO NON-USER: CPT | Performed by: INTERNAL MEDICINE

## 2025-02-05 PROCEDURE — G8419 CALC BMI OUT NRM PARAM NOF/U: HCPCS | Performed by: INTERNAL MEDICINE

## 2025-02-05 PROCEDURE — 1126F AMNT PAIN NOTED NONE PRSNT: CPT | Performed by: INTERNAL MEDICINE

## 2025-02-05 RX ORDER — ISOSORBIDE MONONITRATE 30 MG/1
30 TABLET, EXTENDED RELEASE ORAL DAILY
Qty: 90 TABLET | Refills: 3 | Status: SHIPPED | OUTPATIENT
Start: 2025-02-05 | End: 2025-02-05

## 2025-02-05 RX ORDER — ATORVASTATIN CALCIUM 40 MG/1
40 TABLET, FILM COATED ORAL DAILY
Qty: 90 TABLET | Refills: 3 | Status: SHIPPED | OUTPATIENT
Start: 2025-02-05

## 2025-02-05 RX ORDER — ATORVASTATIN CALCIUM 40 MG/1
40 TABLET, FILM COATED ORAL DAILY
Qty: 90 TABLET | Refills: 3 | Status: SHIPPED | OUTPATIENT
Start: 2025-02-05 | End: 2025-02-05

## 2025-02-05 RX ORDER — ISOSORBIDE MONONITRATE 30 MG/1
30 TABLET, EXTENDED RELEASE ORAL DAILY
Qty: 90 TABLET | Refills: 3 | Status: SHIPPED | OUTPATIENT
Start: 2025-02-05

## 2025-02-05 ASSESSMENT — PATIENT HEALTH QUESTIONNAIRE - PHQ9
SUM OF ALL RESPONSES TO PHQ QUESTIONS 1-9: 0
SUM OF ALL RESPONSES TO PHQ QUESTIONS 1-9: 0
1. LITTLE INTEREST OR PLEASURE IN DOING THINGS: NOT AT ALL
SUM OF ALL RESPONSES TO PHQ QUESTIONS 1-9: 0
SUM OF ALL RESPONSES TO PHQ QUESTIONS 1-9: 0
2. FEELING DOWN, DEPRESSED OR HOPELESS: NOT AT ALL
SUM OF ALL RESPONSES TO PHQ9 QUESTIONS 1 & 2: 0

## 2025-02-05 NOTE — PROGRESS NOTES
1. Have you been to the ER, urgent care clinic since your last visit?  Hospitalized since your last visit?No    2. Have you seen or consulted any other health care providers outside of the [facility] since your last visit?  Include any pap smears or colon screening. No

## 2025-02-05 NOTE — PROGRESS NOTES
Cardiology  Clinic -   Follow up Visit   Date of Service : 2/5/2025    Name: Marlo Chung  Patient ID: [id number]  Age: 69 y.o. Sex: male YOB: 1955    Author: COREY BANERJEE MD    PCP: Jason Villar MD    Referring Provider:Jason Villar MD    Reason for Visit / CC:    Chief Complaint   Patient presents with    Chest Pain    Hyperlipidemia    Follow-up    Results     Cath, discuss meds          ASSESSMENT  AND PLAN           Assessment & Plan  1. Chronic stable angina with nonobstructive coronary artery disease with slow flow in coronary artery disease suggestive of microvascular dysfunction.  He had undergone exercise stress test which was mildly abnormal, most likely due to microvascular dysfunction followed by heart catheterization, which revealed nonobstructive coronary artery disease with slow flow. This is most likely the cause of his chest pain. At present, he is doing much better and reports no chest pain since last visit. Aggressive medical management including diet and exercise is recommended. He is advised to continue cardiac rehab. Continue Imdur 30 mg once daily for vasodilatation and increased blood flow. Continue aspirin 81 mg p.o. q. daily for nonobstructive CAD.    2. Hyperlipidemia.  This is fairly well controlled on Lipitor 40 mg once daily. Emphasized importance of diet and exercise including cutting down of fatty food. Continue Lipitor 40 mg once a day.    3. Hand tremors.  This is fairly well controlled on propranolol and primidone.    4. Severe bradycardia.  This is asymptomatic, most likely due to propranolol. This will continue to be monitored.    Follow-up  The patient will follow up in 6 months.    PROCEDURE  The patient underwent a left heart catheterization which showed moderate one vessel disease with mild two vessel disease and slow flow in epicardial arteries suggestive of possible microvascular dysfunction with normal LVEDP.    Patient advised

## 2025-02-05 NOTE — PATIENT INSTRUCTIONS
Dear Joseph,    Thank you for visiting my office today. Your commitment to improving your health and managing your cardiac condition is highly valued.    Following our consultation, here are the key instructions and recommendations for your care plan:    Medications:    Continue taking Aspirin as previously directed.    Resume taking Lipitor for cholesterol management; a prescription for it has been issued today.    Take Isosorbide Mononitrate as prescribed to help dilate your blood vessels and improve blood flow.    Lifestyle and Activity:    Continue participation in cardio rehabilitation 2-3 times a week. Your results can be viewed through the medical portal.    Maintain a healthy diet and regular exercise to manage microvascular dysfunction and chronic stable angina.    Monitoring:    Monitor any shortness of breath and report any significant changes.    Follow-Up Care:    Schedule a follow-up appointment in six months to reassess your condition.    Please do not hesitate to reach out if you have any questions or concerns before our next scheduled meeting.    Best regards,    Dr. Avila Kinney MD  Cardiology

## 2025-02-06 ENCOUNTER — HOSPITAL ENCOUNTER (OUTPATIENT)
Facility: HOSPITAL | Age: 70
Setting detail: RECURRING SERIES
Discharge: HOME | End: 2025-02-09
Attending: INTERNAL MEDICINE
Payer: MEDICARE

## 2025-02-06 VITALS — BODY MASS INDEX: 27.36 KG/M2 | WEIGHT: 196.2 LBS

## 2025-02-06 PROCEDURE — 93798 PHYS/QHP OP CAR RHAB W/ECG: CPT

## 2025-02-06 ASSESSMENT — EXERCISE STRESS TEST
PEAK_RPE: 13
PEAK_METS: 4.6
PEAK_HR: 107

## 2025-02-07 ENCOUNTER — HOSPITAL ENCOUNTER (OUTPATIENT)
Facility: HOSPITAL | Age: 70
Setting detail: RECURRING SERIES
Discharge: HOME | End: 2025-02-10
Attending: INTERNAL MEDICINE
Payer: MEDICARE

## 2025-02-07 VITALS — WEIGHT: 195.2 LBS | BODY MASS INDEX: 27.22 KG/M2

## 2025-02-07 PROCEDURE — 93798 PHYS/QHP OP CAR RHAB W/ECG: CPT

## 2025-02-07 ASSESSMENT — EXERCISE STRESS TEST
PEAK_METS: 4.6
PEAK_HR: 103
PEAK_RPE: 13

## 2025-02-11 ENCOUNTER — APPOINTMENT (OUTPATIENT)
Facility: HOSPITAL | Age: 70
End: 2025-02-11
Attending: INTERNAL MEDICINE
Payer: MEDICARE

## 2025-02-13 ENCOUNTER — HOSPITAL ENCOUNTER (OUTPATIENT)
Facility: HOSPITAL | Age: 70
Setting detail: RECURRING SERIES
Discharge: HOME | End: 2025-02-16
Attending: INTERNAL MEDICINE
Payer: MEDICARE

## 2025-02-13 VITALS — BODY MASS INDEX: 27.11 KG/M2 | WEIGHT: 194.4 LBS

## 2025-02-13 PROCEDURE — 93798 PHYS/QHP OP CAR RHAB W/ECG: CPT

## 2025-02-13 ASSESSMENT — EXERCISE STRESS TEST
PEAK_HR: 104
PEAK_METS: 4.6
PEAK_RPE: 13

## 2025-02-18 ENCOUNTER — HOSPITAL ENCOUNTER (OUTPATIENT)
Facility: HOSPITAL | Age: 70
Setting detail: RECURRING SERIES
Discharge: HOME | End: 2025-02-21
Attending: INTERNAL MEDICINE
Payer: MEDICARE

## 2025-02-18 VITALS — BODY MASS INDEX: 27.36 KG/M2 | WEIGHT: 196.2 LBS

## 2025-02-18 PROCEDURE — 93798 PHYS/QHP OP CAR RHAB W/ECG: CPT

## 2025-02-18 ASSESSMENT — EXERCISE STRESS TEST
PEAK_METS: 4.6
PEAK_HR: 103
PEAK_RPE: 13

## 2025-02-20 ENCOUNTER — APPOINTMENT (OUTPATIENT)
Facility: HOSPITAL | Age: 70
End: 2025-02-20
Attending: INTERNAL MEDICINE
Payer: MEDICARE

## 2025-02-21 ENCOUNTER — HOSPITAL ENCOUNTER (OUTPATIENT)
Facility: HOSPITAL | Age: 70
Setting detail: RECURRING SERIES
Discharge: HOME | End: 2025-02-24
Attending: INTERNAL MEDICINE
Payer: MEDICARE

## 2025-02-21 VITALS — BODY MASS INDEX: 27.49 KG/M2 | WEIGHT: 197.1 LBS

## 2025-02-21 PROCEDURE — 93798 PHYS/QHP OP CAR RHAB W/ECG: CPT

## 2025-02-21 ASSESSMENT — EXERCISE STRESS TEST
PEAK_METS: 4.6
PEAK_HR: 144
PEAK_RPE: 13

## 2025-02-25 ENCOUNTER — HOSPITAL ENCOUNTER (OUTPATIENT)
Facility: HOSPITAL | Age: 70
Setting detail: RECURRING SERIES
Discharge: HOME | End: 2025-02-28
Attending: INTERNAL MEDICINE
Payer: MEDICARE

## 2025-02-25 VITALS — BODY MASS INDEX: 27.62 KG/M2 | WEIGHT: 198 LBS

## 2025-02-25 PROCEDURE — 93798 PHYS/QHP OP CAR RHAB W/ECG: CPT

## 2025-02-25 ASSESSMENT — EXERCISE STRESS TEST
PEAK_HR: 112
PEAK_METS: 4.6
PEAK_RPE: 13

## 2025-02-27 ENCOUNTER — HOSPITAL ENCOUNTER (OUTPATIENT)
Facility: HOSPITAL | Age: 70
Setting detail: RECURRING SERIES
End: 2025-02-27
Attending: INTERNAL MEDICINE
Payer: MEDICARE

## 2025-02-27 VITALS — BODY MASS INDEX: 27.56 KG/M2 | WEIGHT: 197.6 LBS

## 2025-02-27 PROCEDURE — 93798 PHYS/QHP OP CAR RHAB W/ECG: CPT

## 2025-02-27 ASSESSMENT — EXERCISE STRESS TEST
PEAK_HR: 106
PEAK_RPE: 13
PEAK_METS: 4.6

## 2025-03-04 ENCOUNTER — HOSPITAL ENCOUNTER (OUTPATIENT)
Facility: HOSPITAL | Age: 70
Setting detail: RECURRING SERIES
Discharge: HOME | End: 2025-03-07
Attending: INTERNAL MEDICINE
Payer: MEDICARE

## 2025-03-04 VITALS — BODY MASS INDEX: 27.41 KG/M2 | WEIGHT: 196.5 LBS

## 2025-03-04 PROCEDURE — 93798 PHYS/QHP OP CAR RHAB W/ECG: CPT

## 2025-03-04 ASSESSMENT — EXERCISE STRESS TEST
PEAK_BP: 175/68
PEAK_HR: 107
PEAK_RPE: 13
PEAK_METS: 3.4

## 2025-03-07 ENCOUNTER — HOSPITAL ENCOUNTER (OUTPATIENT)
Facility: HOSPITAL | Age: 70
Setting detail: RECURRING SERIES
Discharge: HOME | End: 2025-03-10
Attending: INTERNAL MEDICINE
Payer: MEDICARE

## 2025-03-07 VITALS — BODY MASS INDEX: 27.45 KG/M2 | WEIGHT: 196.8 LBS

## 2025-03-07 PROCEDURE — 93798 PHYS/QHP OP CAR RHAB W/ECG: CPT

## 2025-03-07 ASSESSMENT — EXERCISE STRESS TEST
PEAK_RPE: 13
PEAK_METS: 4.6
PEAK_HR: 107

## 2025-03-11 ENCOUNTER — APPOINTMENT (OUTPATIENT)
Facility: HOSPITAL | Age: 70
End: 2025-03-11
Attending: INTERNAL MEDICINE
Payer: MEDICARE

## 2025-03-13 ENCOUNTER — HOSPITAL ENCOUNTER (OUTPATIENT)
Facility: HOSPITAL | Age: 70
Setting detail: RECURRING SERIES
Discharge: HOME | End: 2025-03-16
Attending: INTERNAL MEDICINE
Payer: MEDICARE

## 2025-03-13 VITALS — WEIGHT: 195 LBS | BODY MASS INDEX: 27.2 KG/M2

## 2025-03-13 PROCEDURE — 93797 PHYS/QHP OP CAR RHAB WO ECG: CPT

## 2025-03-13 PROCEDURE — 93798 PHYS/QHP OP CAR RHAB W/ECG: CPT

## 2025-03-13 ASSESSMENT — EXERCISE STRESS TEST
PEAK_RPE: 13
PEAK_METS: 4.6
PEAK_HR: 104

## 2025-03-13 NOTE — CARDIO/PULMONARY
DISCHARGE SUMMARY NOTE  3/13/2025      NAME: Marlo Chung : 1955 AGE: 69 y.o.  GENDER: male    CARDIAC REHAB ADMITTING DIAGNOSIS: Angina pectoris with coronary microvascular dysfunction [I20.81]    REFERRING PHYSICIAN: Avila Kinney MD    MEDICAL HX:  Past Medical History:   Diagnosis Date    Angina concurrent with and due to arteriosclerosis of coronary artery 10/15/2024    Microvascular dysfunction per Dr Avila Kinney    Arthritis     Rt TKR, Lt knee pain, Lt shoulder rotator cuff tear    CAD (coronary artery disease) 10/15/2024    cardiac cath with OM1 60%    Hyperlipidemia     Hypertension     Tremor     Benign Essential Tremors       LABS:     No results found for: \"HBA1C\", \"[id number]\"  No results found for: \"CHOL\", \"CHOLPOCT\", \"CHLST\", \"CHOLV\", \"HDL\", \"HDLPOC\", \"HDLC\", \"LDL\", \"LDLC\", \"VLDLC\", \"VLDL\"      ANTHROPOMETRICS:      Ht Readings from Last 1 Encounters:   25 1.803 m (5' 11\")      Wt Readings from Last 1 Encounters:   25 88.5 kg (195 lb)        WAIST:  (44@intake)         PROGRAM SUMMARY:    Marlo Chung completed 36/36 sessions in the Cardiac Rehab program. He will continue exercising 5 x week and focus on diet and nutrition at home. He attended 3 classes and is aware of his cardiac risk factors and cardiac medications. Weight loss was 7 lbs. MET level increase from 2.4 to 3.4.     Questions addressed. Discharge plans discussed. Marlo Chung verbalized understanding.      Jennie Falk, RN, RN

## 2025-03-27 ENCOUNTER — HOSPITAL ENCOUNTER (OUTPATIENT)
Facility: HOSPITAL | Age: 70
Setting detail: RECURRING SERIES
Discharge: HOME | End: 2025-03-30
Attending: INTERNAL MEDICINE
Payer: MEDICARE

## 2025-03-27 VITALS — BODY MASS INDEX: 27.34 KG/M2 | WEIGHT: 196 LBS

## 2025-03-27 PROCEDURE — 9900000112 HC DAILY CARDIAC MAINTENANCE (RETAIL)

## 2025-03-27 ASSESSMENT — EXERCISE STRESS TEST
PEAK_RPE: 13
PEAK_METS: 4.6

## 2025-03-28 ENCOUNTER — HOSPITAL ENCOUNTER (OUTPATIENT)
Facility: HOSPITAL | Age: 70
Setting detail: RECURRING SERIES
Discharge: HOME | End: 2025-03-31
Attending: INTERNAL MEDICINE
Payer: MEDICARE

## 2025-03-28 VITALS — BODY MASS INDEX: 27.62 KG/M2 | WEIGHT: 198 LBS

## 2025-03-28 PROCEDURE — 93798 PHYS/QHP OP CAR RHAB W/ECG: CPT

## 2025-03-28 PROCEDURE — 9900000112 HC DAILY CARDIAC MAINTENANCE (RETAIL)

## 2025-03-28 ASSESSMENT — EXERCISE STRESS TEST
PEAK_METS: 4.6
PEAK_RPE: 13

## 2025-04-01 ENCOUNTER — HOSPITAL ENCOUNTER (OUTPATIENT)
Facility: HOSPITAL | Age: 70
Setting detail: RECURRING SERIES
Discharge: HOME | End: 2025-04-04
Attending: INTERNAL MEDICINE
Payer: MEDICARE

## 2025-04-01 VITALS — WEIGHT: 199 LBS | BODY MASS INDEX: 27.75 KG/M2

## 2025-04-01 PROCEDURE — 9900000112 HC DAILY CARDIAC MAINTENANCE (RETAIL)

## 2025-04-01 ASSESSMENT — EXERCISE STRESS TEST
PEAK_RPE: 13
PEAK_METS: 4.6

## 2025-04-03 ENCOUNTER — HOSPITAL ENCOUNTER (OUTPATIENT)
Facility: HOSPITAL | Age: 70
Setting detail: RECURRING SERIES
Discharge: HOME | End: 2025-04-06
Attending: INTERNAL MEDICINE
Payer: MEDICARE

## 2025-04-03 VITALS — WEIGHT: 198 LBS | BODY MASS INDEX: 27.62 KG/M2

## 2025-04-03 PROCEDURE — 9900000112 HC DAILY CARDIAC MAINTENANCE (RETAIL)

## 2025-04-03 ASSESSMENT — EXERCISE STRESS TEST
PEAK_METS: 4.6
PEAK_RPE: 13

## 2025-04-07 ENCOUNTER — HOSPITAL ENCOUNTER (OUTPATIENT)
Facility: HOSPITAL | Age: 70
Setting detail: RECURRING SERIES
End: 2025-04-07
Attending: INTERNAL MEDICINE
Payer: MEDICARE

## 2025-04-10 ENCOUNTER — HOSPITAL ENCOUNTER (OUTPATIENT)
Facility: HOSPITAL | Age: 70
Setting detail: RECURRING SERIES
Discharge: HOME | End: 2025-04-13
Attending: INTERNAL MEDICINE
Payer: MEDICARE

## 2025-04-10 VITALS — WEIGHT: 195 LBS | BODY MASS INDEX: 27.2 KG/M2

## 2025-04-10 PROCEDURE — 9900000112 HC DAILY CARDIAC MAINTENANCE (RETAIL)

## 2025-04-10 ASSESSMENT — EXERCISE STRESS TEST
PEAK_RPE: 13
PEAK_METS: 4.6

## 2025-04-14 ENCOUNTER — HOSPITAL ENCOUNTER (OUTPATIENT)
Facility: HOSPITAL | Age: 70
Setting detail: RECURRING SERIES
Discharge: HOME | End: 2025-04-17
Attending: INTERNAL MEDICINE
Payer: MEDICARE

## 2025-04-14 VITALS — WEIGHT: 194 LBS | BODY MASS INDEX: 27.06 KG/M2

## 2025-04-14 PROCEDURE — 9900000112 HC DAILY CARDIAC MAINTENANCE (RETAIL)

## 2025-04-14 ASSESSMENT — EXERCISE STRESS TEST: PEAK_RPE: 13

## 2025-04-17 ENCOUNTER — HOSPITAL ENCOUNTER (OUTPATIENT)
Facility: HOSPITAL | Age: 70
Setting detail: RECURRING SERIES
Discharge: HOME | End: 2025-04-20
Attending: INTERNAL MEDICINE

## 2025-04-17 VITALS — WEIGHT: 195 LBS | BODY MASS INDEX: 27.2 KG/M2

## 2025-04-17 PROCEDURE — 93798 PHYS/QHP OP CAR RHAB W/ECG: CPT

## 2025-04-17 PROCEDURE — 9900000112 HC DAILY CARDIAC MAINTENANCE (RETAIL)

## 2025-04-17 ASSESSMENT — EXERCISE STRESS TEST
PEAK_METS: 4.6
PEAK_RPE: 13

## 2025-04-21 ENCOUNTER — HOSPITAL ENCOUNTER (OUTPATIENT)
Facility: HOSPITAL | Age: 70
Setting detail: RECURRING SERIES
Discharge: HOME | End: 2025-04-24
Attending: INTERNAL MEDICINE

## 2025-04-21 VITALS — BODY MASS INDEX: 27.48 KG/M2 | WEIGHT: 197 LBS

## 2025-04-21 PROCEDURE — 9900000112 HC DAILY CARDIAC MAINTENANCE (RETAIL)

## 2025-04-21 ASSESSMENT — EXERCISE STRESS TEST
PEAK_RPE: 13
PEAK_METS: 4.6

## 2025-04-24 ENCOUNTER — HOSPITAL ENCOUNTER (OUTPATIENT)
Facility: HOSPITAL | Age: 70
Setting detail: RECURRING SERIES
Discharge: HOME | End: 2025-04-27
Attending: INTERNAL MEDICINE
Payer: MEDICARE

## 2025-04-24 VITALS — WEIGHT: 195 LBS | BODY MASS INDEX: 27.2 KG/M2

## 2025-04-24 PROCEDURE — 93797 PHYS/QHP OP CAR RHAB WO ECG: CPT

## 2025-04-24 PROCEDURE — 9900000112 HC DAILY CARDIAC MAINTENANCE (RETAIL)

## 2025-04-24 ASSESSMENT — EXERCISE STRESS TEST
PEAK_METS: 4.6
PEAK_RPE: 13

## 2025-04-28 ENCOUNTER — HOSPITAL ENCOUNTER (OUTPATIENT)
Facility: HOSPITAL | Age: 70
Discharge: HOME | End: 2025-05-01
Attending: INTERNAL MEDICINE
Payer: MEDICARE

## 2025-04-28 VITALS — BODY MASS INDEX: 27.2 KG/M2 | WEIGHT: 195 LBS

## 2025-04-28 PROCEDURE — 9900000112 HC DAILY CARDIAC MAINTENANCE (RETAIL)

## 2025-04-28 ASSESSMENT — EXERCISE STRESS TEST
PEAK_RPE: 13
PEAK_METS: 4.6

## 2025-05-02 ENCOUNTER — HOSPITAL ENCOUNTER (OUTPATIENT)
Facility: HOSPITAL | Age: 70
Discharge: HOME | End: 2025-05-05
Attending: INTERNAL MEDICINE

## 2025-05-02 VITALS — BODY MASS INDEX: 27.06 KG/M2 | WEIGHT: 194 LBS

## 2025-05-02 PROCEDURE — 9900000112 HC DAILY CARDIAC MAINTENANCE (RETAIL)

## 2025-05-02 ASSESSMENT — EXERCISE STRESS TEST
PEAK_RPE: 13
PEAK_METS: 4.6

## 2025-05-07 ENCOUNTER — HOSPITAL ENCOUNTER (OUTPATIENT)
Facility: HOSPITAL | Age: 70
Discharge: HOME | End: 2025-05-10
Attending: INTERNAL MEDICINE

## 2025-05-07 VITALS — WEIGHT: 195 LBS | BODY MASS INDEX: 27.2 KG/M2

## 2025-05-07 PROCEDURE — 9900000112 HC DAILY CARDIAC MAINTENANCE (RETAIL)

## 2025-05-07 ASSESSMENT — EXERCISE STRESS TEST
PEAK_METS: 4.6
PEAK_RPE: 13

## 2025-05-08 ENCOUNTER — HOSPITAL ENCOUNTER (OUTPATIENT)
Facility: HOSPITAL | Age: 70
Discharge: HOME | End: 2025-05-11
Attending: INTERNAL MEDICINE

## 2025-05-08 VITALS — BODY MASS INDEX: 27.2 KG/M2 | WEIGHT: 195 LBS

## 2025-05-08 PROCEDURE — 9900000112 HC DAILY CARDIAC MAINTENANCE (RETAIL)

## 2025-05-08 ASSESSMENT — EXERCISE STRESS TEST
PEAK_METS: 4.6
PEAK_RPE: 13

## 2025-05-12 ENCOUNTER — HOSPITAL ENCOUNTER (OUTPATIENT)
Facility: HOSPITAL | Age: 70
Setting detail: RECURRING SERIES
Discharge: HOME | End: 2025-05-15
Attending: INTERNAL MEDICINE
Payer: MEDICARE

## 2025-05-12 VITALS — WEIGHT: 194 LBS | BODY MASS INDEX: 27.06 KG/M2

## 2025-05-12 PROCEDURE — 9900000112 HC DAILY CARDIAC MAINTENANCE (RETAIL)

## 2025-05-15 ENCOUNTER — HOSPITAL ENCOUNTER (OUTPATIENT)
Facility: HOSPITAL | Age: 70
Setting detail: RECURRING SERIES
Discharge: HOME | End: 2025-05-18
Attending: INTERNAL MEDICINE
Payer: MEDICARE

## 2025-05-15 VITALS — WEIGHT: 195 LBS | BODY MASS INDEX: 27.2 KG/M2

## 2025-05-15 PROCEDURE — 9900000112 HC DAILY CARDIAC MAINTENANCE (RETAIL)

## 2025-05-15 ASSESSMENT — EXERCISE STRESS TEST
PEAK_RPE: 13
PEAK_METS: 4.6

## 2025-08-20 ENCOUNTER — OFFICE VISIT (OUTPATIENT)
Age: 70
End: 2025-08-20
Payer: MEDICARE

## 2025-08-20 VITALS
WEIGHT: 199 LBS | HEIGHT: 71 IN | OXYGEN SATURATION: 96 % | HEART RATE: 47 BPM | SYSTOLIC BLOOD PRESSURE: 120 MMHG | BODY MASS INDEX: 27.86 KG/M2 | DIASTOLIC BLOOD PRESSURE: 78 MMHG

## 2025-08-20 DIAGNOSIS — I25.83 CORONARY ARTERY DISEASE DUE TO LIPID RICH PLAQUE: ICD-10-CM

## 2025-08-20 DIAGNOSIS — I25.10 CORONARY ARTERY DISEASE DUE TO LIPID RICH PLAQUE: ICD-10-CM

## 2025-08-20 DIAGNOSIS — I20.89 STABLE ANGINA: ICD-10-CM

## 2025-08-20 DIAGNOSIS — I25.85 CHRONIC CORONARY MICROVASCULAR DYSFUNCTION: Primary | ICD-10-CM

## 2025-08-20 PROCEDURE — 4004F PT TOBACCO SCREEN RCVD TLK: CPT | Performed by: INTERNAL MEDICINE

## 2025-08-20 PROCEDURE — 99214 OFFICE O/P EST MOD 30 MIN: CPT | Performed by: INTERNAL MEDICINE

## 2025-08-20 PROCEDURE — G8427 DOCREV CUR MEDS BY ELIG CLIN: HCPCS | Performed by: INTERNAL MEDICINE

## 2025-08-20 PROCEDURE — G8419 CALC BMI OUT NRM PARAM NOF/U: HCPCS | Performed by: INTERNAL MEDICINE

## 2025-08-20 PROCEDURE — 1126F AMNT PAIN NOTED NONE PRSNT: CPT | Performed by: INTERNAL MEDICINE

## 2025-08-20 PROCEDURE — 3017F COLORECTAL CA SCREEN DOC REV: CPT | Performed by: INTERNAL MEDICINE

## 2025-08-20 PROCEDURE — 1159F MED LIST DOCD IN RCRD: CPT | Performed by: INTERNAL MEDICINE

## 2025-08-20 PROCEDURE — G2211 COMPLEX E/M VISIT ADD ON: HCPCS | Performed by: INTERNAL MEDICINE

## 2025-08-20 PROCEDURE — 1123F ACP DISCUSS/DSCN MKR DOCD: CPT | Performed by: INTERNAL MEDICINE

## 2025-08-20 ASSESSMENT — PATIENT HEALTH QUESTIONNAIRE - PHQ9
SUM OF ALL RESPONSES TO PHQ QUESTIONS 1-9: 0
1. LITTLE INTEREST OR PLEASURE IN DOING THINGS: NOT AT ALL
SUM OF ALL RESPONSES TO PHQ QUESTIONS 1-9: 0
SUM OF ALL RESPONSES TO PHQ QUESTIONS 1-9: 0
2. FEELING DOWN, DEPRESSED OR HOPELESS: NOT AT ALL
SUM OF ALL RESPONSES TO PHQ QUESTIONS 1-9: 0

## 2025-08-20 ASSESSMENT — LIFESTYLE VARIABLES
HOW OFTEN DO YOU HAVE A DRINK CONTAINING ALCOHOL: 4 OR MORE TIMES A WEEK
HOW MANY STANDARD DRINKS CONTAINING ALCOHOL DO YOU HAVE ON A TYPICAL DAY: 1 OR 2

## (undated) DEVICE — TR BAND RADIAL ARTERY COMPRESSION DEVICE: Brand: TR BAND

## (undated) DEVICE — WASTE KIT - ST MARY: Brand: MEDLINE INDUSTRIES, INC.

## (undated) DEVICE — KIT MFLD ISOLATN NACL CNTRST PRT TBNG SPIK W/ PRSS TRNSDUC

## (undated) DEVICE — Device

## (undated) DEVICE — ANGIOGRAPHY KIT

## (undated) DEVICE — KIT AT-X65 ANGIOTOUCH HAND CONTROLLER

## (undated) DEVICE — KIT MED IMAG CNTRST AGNT W/ IOPAMIDOL REUSE

## (undated) DEVICE — CATHETER DIAG 5FR L100CM LUMN ID0.047IN JR4 CRV 0 SIDE H

## (undated) DEVICE — CATHETER DIAG 5FR L100CM LUMN ID0.047IN JL3.5 CRV 0 SIDE H

## (undated) DEVICE — KIT HND CTRL 3 W STPCOCK ROT END 54IN PREM HI PRSS TBNG AT

## (undated) DEVICE — SPLINT WR VELC FOAM NEUT POS DISP FOR RAD ART ACC SFT STRP

## (undated) DEVICE — SPECIAL PROCEDURE DRAPE 32" X 34": Brand: SPECIAL PROCEDURE DRAPE

## (undated) DEVICE — PADPRO DEFIBRILLATION/PACING/CARDIOVERSION/MONITORING ELECTRODES, ADULT/CHILD GREATER THAN 10 KG RADIOTRANSPARENT ELECTRODE, PHYSIO-CONTROL QUIK-COMBO (M) 60" (152 CM): Brand: PADPRO

## (undated) DEVICE — GLIDESHEATH SLENDER STAINLESS STEEL KIT: Brand: GLIDESHEATH SLENDER